# Patient Record
Sex: MALE | Race: WHITE | NOT HISPANIC OR LATINO | Employment: FULL TIME | URBAN - METROPOLITAN AREA
[De-identification: names, ages, dates, MRNs, and addresses within clinical notes are randomized per-mention and may not be internally consistent; named-entity substitution may affect disease eponyms.]

---

## 2018-01-12 ENCOUNTER — OFFICE VISIT (OUTPATIENT)
Dept: FAMILY MEDICINE | Facility: CLINIC | Age: 28
End: 2018-01-12
Payer: COMMERCIAL

## 2018-01-12 VITALS
TEMPERATURE: 98.8 F | BODY MASS INDEX: 23.24 KG/M2 | HEART RATE: 80 BPM | SYSTOLIC BLOOD PRESSURE: 108 MMHG | HEIGHT: 71 IN | WEIGHT: 166 LBS | DIASTOLIC BLOOD PRESSURE: 70 MMHG

## 2018-01-12 DIAGNOSIS — B37.9 CANDIDA INFECTION: Primary | ICD-10-CM

## 2018-01-12 PROCEDURE — 99213 OFFICE O/P EST LOW 20 MIN: CPT | Performed by: NURSE PRACTITIONER

## 2018-01-12 RX ORDER — NYSTATIN 100000 U/G
CREAM TOPICAL 3 TIMES DAILY
Qty: 15 G | Refills: 1 | Status: SHIPPED | OUTPATIENT
Start: 2018-01-12 | End: 2018-01-30

## 2018-01-12 NOTE — PATIENT INSTRUCTIONS
Nystatin sent to the pharmacy for your symptoms    Follow up if symptoms do not improve or worsen.

## 2018-01-12 NOTE — PROGRESS NOTES
"  SUBJECTIVE:   Jesse Cisneros is a 27 year old male who presents to clinic today for the following health issues:      Wound       Duration: 2 week     Description (location/character/radiation): wound like are in butt crack     Intensity:  mild    Accompanying signs and symptoms: itching and pain a times. Drains clear liquid.     History (similar episodes/previous evaluation): None    Precipitating or alleviating factors: None    Therapies tried and outcome: Nick       Problem list and histories reviewed & adjusted, as indicated.  Additional history: as documented    Patient Active Problem List   Diagnosis     iamLUMBAGO     History reviewed. No pertinent surgical history.    Social History   Substance Use Topics     Smoking status: Current Every Day Smoker     Packs/day: 1.00     Types: Cigarettes     Smokeless tobacco: Former User     Alcohol use Yes      Comment: 1 beer daily.     History reviewed. No pertinent family history.      Current Outpatient Prescriptions   Medication Sig Dispense Refill     nystatin (MYCOSTATIN) cream Apply topically 3 times daily for 14 days 15 g 1     No Known Allergies  Labs reviewed in EPIC      Reviewed and updated as needed this visit by clinical staff     Reviewed and updated as needed this visit by Provider       ROS:  Constitutional, HEENT, cardiovascular, pulmonary, gi and gu systems are negative, except as otherwise noted.      OBJECTIVE:   /70 (Cuff Size: Adult Regular)  Pulse 80  Temp 98.8  F (37.1  C) (Tympanic)  Ht 5' 10.75\" (1.797 m)  Wt 166 lb (75.3 kg)  BMI 23.32 kg/m2  Body mass index is 23.32 kg/(m^2).  GENERAL: healthy, alert and no distress  SKIN: erythematous patch between intergluteal cleft  PSYCH: mentation appears normal, affect normal/bright    Diagnostic Test Results:  none     ASSESSMENT/PLAN:     1. Candida infection  Nystatin sent to the pharmacy for symptoms.  Symptomatic care and follow up discussed.  - nystatin (MYCOSTATIN) cream; Apply " topically 3 times daily for 14 days  Dispense: 15 g; Refill: 1    Home care instructions were reviewed with the patient. The risks, benefits and treatment options of prescribed medications or other treatments have been discussed with the patient. The patient verbalized their understanding and should call or follow up if no improvement or if they develop further problems.    Patient Instructions   Nystatin sent to the pharmacy for your symptoms    Follow up if symptoms do not improve or worsen.        MILDRED Winslow [facility]

## 2018-01-12 NOTE — MR AVS SNAPSHOT
"              After Visit Summary   2018    Jesse Cisneros    MRN: [id number]           Patient Information     Date Of Birth          1990        Visit Information        Provider Department      2018 4:20 PM Maria Elena Vincent APRN CNP [facility]        Today's Diagnoses     Candida infection    -  1      Care Instructions    Nystatin sent to the pharmacy for your symptoms    Follow up if symptoms do not improve or worsen.            Follow-ups after your visit        Who to contact     If you have questions or need follow up information about today's clinic visit or your schedule please contact [facility] directly at [phone].  Normal or non-critical lab and imaging results will be communicated to you by MyChart, letter or phone within 4 business days after the clinic has received the results. If you do not hear from us within 7 days, please contact the clinic through MyChart or phone. If you have a critical or abnormal lab result, we will notify you by phone as soon as possible.  Submit refill requests through dineout or call your pharmacy and they will forward the refill request to us. Please allow 3 business days for your refill to be completed.          Additional Information About Your Visit        MyChart Information     dineout lets you send messages to your doctor, view your test results, renew your prescriptions, schedule appointments and more. To sign up, go to [email or web address] . Click on \"Log in\" on the left side of the screen, which will take you to the Welcome page. Then click on \"Sign up Now\" on the right side of the page.     You will be asked to enter the access code listed below, as well as some personal information. Please follow the directions to create your username and password.     Your access code is: [email or web address]  Expires: 2018  4:53 PM     Your access code will  in 90 days. If you need help or a new code, " "please call your [facility] clinic or [phone].        Care EveryWhere ID     This is your Care EveryWhere ID. This could be used by other organizations to access your [facility] medical records  [id number]        Your Vitals Were     Pulse Temperature Height BMI (Body Mass Index)          80 98.8  F (37.1  C) (Tympanic) 5' 10.75\" (1.797 m) 23.32 kg/m2         Blood Pressure from Last 3 Encounters:   01/12/18 108/70   05/31/16 114/77   07/23/15 110/73    Weight from Last 3 Encounters:   01/12/18 166 lb (75.3 kg)   07/23/15 150 lb (68 kg)              Today, you had the following     No orders found for display         Today's Medication Changes          These changes are accurate as of: 1/12/18  4:53 PM.  If you have any questions, ask your nurse or doctor.               Start taking these medicines.        Dose/Directions    nystatin cream   Commonly known as:  MYCOSTATIN   Used for:  Candida infection   Started by:  Maria Elena Vincent APRN CNP        Apply topically 3 times daily for 14 days   Quantity:  15 g   Refills:  1         Stop taking these medicines if you haven't already. Please contact your care team if you have questions.     traMADol 50 MG tablet   Commonly known as:  ULTRAM   Stopped by:  Maria Elena Vincent APRN CNP                Where to get your medicines      These medications were sent to [facility] [address] [address]     Phone:  [phone]     nystatin cream                Primary Care Provider Office Phone # Fax #    [facility] [phone] [address] [facility] [address]        Equal Access to Services     DEONNA CORONA AH: Hadii clemente Lynn, wadexterda luqadaha, qaybta kaalmada adeegyada, gila solares. So [facility] [phone].    ATENCIÓN: Si habla español, tiene a sung disposición servicios gratuitos de asistencia lingüística. " Ramirez rowe [phone].    We comply with applicable federal civil rights laws and [place] laws. We do not discriminate on the basis of race, color, national origin, age, disability, sex, sexual orientation, or gender identity.            Thank you!     Thank you for choosing [facility]  for your care. Our goal is always to provide you with excellent care. Hearing back from our patients is one way we can continue to improve our services. Please take a few minutes to complete the written survey that you may receive in the mail after your visit with us. Thank you!             Your Updated Medication List - Protect others around you: Learn how to safely use, store and throw away your medicines at www.disposemymeds.org.          This list is accurate as of: 1/12/18  4:53 PM.  Always use your most recent med list.                   Brand Name Dispense Instructions for use Diagnosis    nystatin cream    MYCOSTATIN    15 g    Apply topically 3 times daily for 14 days    Candida infection

## 2018-01-12 NOTE — NURSING NOTE
"Chief Complaint   Patient presents with     Wound Check       Initial /70 (Cuff Size: Adult Regular)  Pulse 80  Temp 98.8  F (37.1  C) (Tympanic)  Ht 5' 10.75\" (1.797 m)  Wt 166 lb (75.3 kg)  BMI 23.32 kg/m2 Estimated body mass index is 23.32 kg/(m^2) as calculated from the following:    Height as of this encounter: 5' 10.75\" (1.797 m).    Weight as of this encounter: 166 lb (75.3 kg).  Medication Reconciliation: complete    Health Maintenance that is potentially due pending provider review:  NONE    n/a    Kavitha Astudillo, CMA      "

## 2018-01-30 ENCOUNTER — OFFICE VISIT (OUTPATIENT)
Dept: FAMILY MEDICINE | Facility: CLINIC | Age: 28
End: 2018-01-30
Payer: COMMERCIAL

## 2018-01-30 VITALS
WEIGHT: 167 LBS | BODY MASS INDEX: 23.38 KG/M2 | SYSTOLIC BLOOD PRESSURE: 106 MMHG | TEMPERATURE: 98.1 F | DIASTOLIC BLOOD PRESSURE: 64 MMHG | HEART RATE: 72 BPM | HEIGHT: 71 IN

## 2018-01-30 DIAGNOSIS — B37.9 CANDIDA INFECTION: ICD-10-CM

## 2018-01-30 DIAGNOSIS — L03.317 CELLULITIS OF BUTTOCK: Primary | ICD-10-CM

## 2018-01-30 PROCEDURE — 99213 OFFICE O/P EST LOW 20 MIN: CPT | Performed by: NURSE PRACTITIONER

## 2018-01-30 RX ORDER — NYSTATIN 100000 U/G
CREAM TOPICAL 3 TIMES DAILY
Qty: 15 G | Refills: 1 | COMMUNITY
Start: 2018-01-30 | End: 2019-06-05

## 2018-01-30 RX ORDER — CEPHALEXIN 500 MG/1
500 CAPSULE ORAL 3 TIMES DAILY
Qty: 21 CAPSULE | Refills: 0 | Status: SHIPPED | OUTPATIENT
Start: 2018-01-30 | End: 2018-02-06

## 2018-01-30 NOTE — MR AVS SNAPSHOT
"              After Visit Summary   1/30/2018    Jesse Cisneros    MRN: [id number]           Patient Information     Date Of Birth          1990        Visit Information        Provider Department      1/30/2018 4:20 PM Maria Elena Vincent APRN CNP [facility]        Today's Diagnoses     Cellulitis of buttock    -  1    Candida infection          Care Instructions    Keflex sent to the pharmacy for symptoms    Continue with the ointment    Follow up if symptoms do not improve or worsen.            Follow-ups after your visit        Who to contact     If you have questions or need follow up information about today's clinic visit or your schedule please contact [facility] directly at [phone].  Normal or non-critical lab and imaging results will be communicated to you by Percentilhart, letter or phone within 4 business days after the clinic has received the results. If you do not hear from us within 7 days, please contact the clinic through MyChart or phone. If you have a critical or abnormal lab result, we will notify you by phone as soon as possible.  Submit refill requests through itembase or call your pharmacy and they will forward the refill request to us. Please allow 3 business days for your refill to be completed.          Additional Information About Your Visit        MyChart Information     itembase lets you send messages to your doctor, view your test results, renew your prescriptions, schedule appointments and more. To sign up, go to [email or web address] . Click on \"Log in\" on the left side of the screen, which will take you to the Welcome page. Then click on \"Sign up Now\" on the right side of the page.     You will be asked to enter the access code listed below, as well as some personal information. Please follow the directions to create your username and password.     Your access code is: [email or web address]  Expires: 4/12/2018  4:53 PM     Your access code will " " in 90 days. If you need help or a new code, please call your [facility] clinic or [phone].        Care EveryWhere ID     This is your Care EveryWhere ID. This could be used by other organizations to access your [facility] medical records  [id number]        Your Vitals Were     Pulse Temperature Height BMI (Body Mass Index)          72 98.1  F (36.7  C) (Tympanic) 5' 10.75\" (1.797 m) 23.46 kg/m2         Blood Pressure from Last 3 Encounters:   18 106/64   18 108/70   16 114/77    Weight from Last 3 Encounters:   18 167 lb (75.8 kg)   18 166 lb (75.3 kg)   07/23/15 150 lb (68 kg)              Today, you had the following     No orders found for display         Today's Medication Changes          These changes are accurate as of 18  4:29 PM.  If you have any questions, ask your nurse or doctor.               Start taking these medicines.        Dose/Directions    cephALEXin 500 MG capsule   Commonly known as:  KEFLEX   Used for:  Cellulitis of buttock   Started by:  Maria Elena Vincent APRN CNP        Dose:  500 mg   Take 1 capsule (500 mg) by mouth 3 times daily for 7 days   Quantity:  21 capsule   Refills:  0            Where to get your medicines      These medications were sent to [facility] [address] [address]     Phone:  [phone]     cephALEXin 500 MG capsule                Primary Care Provider Office Phone # Fax #    [facility] [phone] [address] [facility] [address]        Equal Access to Services     DEONNA CORONA AH: Hadradames Lynn, tonya marcus, qamagalys kaalmagila diallo. So [facility] [phone].    ATENCIÓN: Si habla español, tiene a sung disposición servicios gratuitos de asistencia lingüística. Llame al [phone].    We comply with applicable federal civil rights laws and " [place] laws. We do not discriminate on the basis of race, color, national origin, age, disability, sex, sexual orientation, or gender identity.            Thank you!     Thank you for choosing [facility]  for your care. Our goal is always to provide you with excellent care. Hearing back from our patients is one way we can continue to improve our services. Please take a few minutes to complete the written survey that you may receive in the mail after your visit with us. Thank you!             Your Updated Medication List - Protect others around you: Learn how to safely use, store and throw away your medicines at www.disposemymeds.org.          This list is accurate as of 1/30/18  4:29 PM.  Always use your most recent med list.                   Brand Name Dispense Instructions for use Diagnosis    cephALEXin 500 MG capsule    KEFLEX    21 capsule    Take 1 capsule (500 mg) by mouth 3 times daily for 7 days    Cellulitis of buttock       nystatin cream    MYCOSTATIN    15 g    Apply topically 3 times daily    Candida infection

## 2018-01-30 NOTE — PATIENT INSTRUCTIONS
Keflex sent to the pharmacy for symptoms    Continue with the ointment    Follow up if symptoms do not improve or worsen.

## 2018-01-30 NOTE — PROGRESS NOTES
"  SUBJECTIVE:   Jesse Cisneros is a 27 year old male who presents to clinic today for the following health issues:      Infection Follow Up       Duration: 1 month     Description (location/character/radiation): wound like area gluteal clef     Intensity:  moderate    Accompanying signs and symptoms: feeling like leaking at times, now has bumps around area that itch     History (similar episodes/previous evaluation): yes - follow up      Therapies tried and outcome: nystatin      Leaking has improved    Problem list and histories reviewed & adjusted, as indicated.  Additional history: as documented    Patient Active Problem List   Diagnosis     iamLUMBAGO     History reviewed. No pertinent surgical history.    Social History   Substance Use Topics     Smoking status: Current Every Day Smoker     Packs/day: 1.00     Types: Cigarettes     Smokeless tobacco: Former User     Alcohol use Yes      Comment: 1 beer daily.     History reviewed. No pertinent family history.      Current Outpatient Prescriptions   Medication Sig Dispense Refill     nystatin (MYCOSTATIN) cream Apply topically 3 times daily 15 g 1     cephALEXin (KEFLEX) 500 MG capsule Take 1 capsule (500 mg) by mouth 3 times daily for 7 days 21 capsule 0     No Known Allergies  Labs reviewed in EPIC    Reviewed and updated as needed this visit by clinical staff       Reviewed and updated as needed this visit by Provider         ROS:  Constitutional, HEENT, cardiovascular, pulmonary, gi and gu systems are negative, except as otherwise noted.    OBJECTIVE:     /64 (Cuff Size: Adult Regular)  Pulse 72  Temp 98.1  F (36.7  C) (Tympanic)  Ht 5' 10.75\" (1.797 m)  Wt 167 lb (75.8 kg)  BMI 23.46 kg/m2  Body mass index is 23.46 kg/(m^2).  GENERAL: healthy, alert and no distress  SKIN: mild erythema surrounding 2 cm split at intergluteal clef   PSYCH: mentation appears normal, affect normal/bright    Diagnostic Test Results:  none     ASSESSMENT/PLAN:     1. " Cellulitis of buttock  With ongoing symptoms and surround erythema Keflex sent to the pharmacy for symptoms.  Symptomatic care and follow up discussed.  - cephALEXin (KEFLEX) 500 MG capsule; Take 1 capsule (500 mg) by mouth 3 times daily for 7 days  Dispense: 21 capsule; Refill: 0    Home care instructions were reviewed with the patient. The risks, benefits and treatment options of prescribed medications or other treatments have been discussed with the patient. The patient verbalized their understanding and should call or follow up if no improvement or if they develop further problems.      MILDRED Winslow [facility]

## 2018-01-30 NOTE — NURSING NOTE
"Chief Complaint   Patient presents with     Infection     follow up        Initial /64 (Cuff Size: Adult Regular)  Pulse 72  Temp 98.1  F (36.7  C) (Tympanic)  Ht 5' 10.75\" (1.797 m)  Wt 167 lb (75.8 kg)  BMI 23.46 kg/m2 Estimated body mass index is 23.46 kg/(m^2) as calculated from the following:    Height as of this encounter: 5' 10.75\" (1.797 m).    Weight as of this encounter: 167 lb (75.8 kg).  Medication Reconciliation: complete    Health Maintenance that is potentially due pending provider review:  NONE    n/a    Kavitha Astudillo, CMA        "

## 2018-02-13 ENCOUNTER — TELEPHONE (OUTPATIENT)
Dept: FAMILY MEDICINE | Facility: CLINIC | Age: 28
End: 2018-02-13

## 2018-02-13 NOTE — TELEPHONE ENCOUNTER
Reason for Call:  Other     Detailed comments: Patient was seen on 01/30/18 and was given nystatin and it worked but now it came back - please call pt    Phone Number Patient can be reached at: Home number on file [phone] (home)    Best Time:     Can we leave a detailed message on this number? YES    Call taken on 2/13/2018 at 3:30 PM by Aziza Fernandez

## 2018-02-13 NOTE — TELEPHONE ENCOUNTER
Matias says the area was getting better but now the crack is back on his skin . He is using the Nystatin cream 3 times per day. Appointment made for tomorrow to make sure it is not infected again

## 2019-06-05 ENCOUNTER — OFFICE VISIT (OUTPATIENT)
Dept: FAMILY MEDICINE | Facility: CLINIC | Age: 29
End: 2019-06-05
Payer: COMMERCIAL

## 2019-06-05 VITALS
TEMPERATURE: 97.9 F | DIASTOLIC BLOOD PRESSURE: 70 MMHG | BODY MASS INDEX: 21.35 KG/M2 | RESPIRATION RATE: 12 BRPM | WEIGHT: 152.5 LBS | SYSTOLIC BLOOD PRESSURE: 116 MMHG | HEIGHT: 71 IN | HEART RATE: 56 BPM

## 2019-06-05 DIAGNOSIS — R19.7 DIARRHEA, UNSPECIFIED TYPE: Primary | ICD-10-CM

## 2019-06-05 DIAGNOSIS — R10.32 LEFT LOWER QUADRANT PAIN: ICD-10-CM

## 2019-06-05 DIAGNOSIS — R11.2 NAUSEA AND VOMITING, INTRACTABILITY OF VOMITING NOT SPECIFIED, UNSPECIFIED VOMITING TYPE: ICD-10-CM

## 2019-06-05 DIAGNOSIS — R68.89 UNINTENTIONAL WEIGHT CHANGE: ICD-10-CM

## 2019-06-05 LAB
ANION GAP SERPL CALCULATED.3IONS-SCNC: 4 MMOL/L (ref 3–14)
BASOPHILS # BLD AUTO: 0.1 10E9/L (ref 0–0.2)
BASOPHILS NFR BLD AUTO: 1.4 %
BUN SERPL-MCNC: 13 MG/DL (ref 7–30)
CALCIUM SERPL-MCNC: 8.9 MG/DL (ref 8.5–10.1)
CHLORIDE SERPL-SCNC: 109 MMOL/L (ref 94–109)
CO2 SERPL-SCNC: 24 MMOL/L (ref 20–32)
CREAT SERPL-MCNC: 0.83 MG/DL (ref 0.66–1.25)
DIFFERENTIAL METHOD BLD: ABNORMAL
EOSINOPHIL # BLD AUTO: 0.2 10E9/L (ref 0–0.7)
EOSINOPHIL NFR BLD AUTO: 2.4 %
ERYTHROCYTE [DISTWIDTH] IN BLOOD BY AUTOMATED COUNT: 12.9 % (ref 10–15)
GFR SERPL CREATININE-BSD FRML MDRD: >90 ML/MIN/{1.73_M2}
GLUCOSE SERPL-MCNC: 101 MG/DL (ref 70–99)
HCT VFR BLD AUTO: 40.7 % (ref 40–53)
HGB BLD-MCNC: 13.9 G/DL (ref 13.3–17.7)
LYMPHOCYTES # BLD AUTO: 1.2 10E9/L (ref 0.8–5.3)
LYMPHOCYTES NFR BLD AUTO: 18.8 %
MCH RBC QN AUTO: 32.9 PG (ref 26.5–33)
MCHC RBC AUTO-ENTMCNC: 34.2 G/DL (ref 31.5–36.5)
MCV RBC AUTO: 96 FL (ref 78–100)
MONOCYTES # BLD AUTO: 0.7 10E9/L (ref 0–1.3)
MONOCYTES NFR BLD AUTO: 10.5 %
NEUTROPHILS # BLD AUTO: 4.2 10E9/L (ref 1.6–8.3)
NEUTROPHILS NFR BLD AUTO: 66.9 %
PLATELET # BLD AUTO: 264 10E9/L (ref 150–450)
POTASSIUM SERPL-SCNC: 4.5 MMOL/L (ref 3.4–5.3)
RBC # BLD AUTO: 4.22 10E12/L (ref 4.4–5.9)
SODIUM SERPL-SCNC: 137 MMOL/L (ref 133–144)
TSH SERPL DL<=0.005 MIU/L-ACNC: 0.55 MU/L (ref 0.4–4)
WBC # BLD AUTO: 6.3 10E9/L (ref 4–11)

## 2019-06-05 PROCEDURE — 85025 COMPLETE CBC W/AUTO DIFF WBC: CPT | Performed by: NURSE PRACTITIONER

## 2019-06-05 PROCEDURE — 99214 OFFICE O/P EST MOD 30 MIN: CPT | Performed by: NURSE PRACTITIONER

## 2019-06-05 PROCEDURE — 84443 ASSAY THYROID STIM HORMONE: CPT | Performed by: NURSE PRACTITIONER

## 2019-06-05 PROCEDURE — 36415 COLL VENOUS BLD VENIPUNCTURE: CPT | Performed by: NURSE PRACTITIONER

## 2019-06-05 PROCEDURE — 80048 BASIC METABOLIC PNL TOTAL CA: CPT | Performed by: NURSE PRACTITIONER

## 2019-06-05 RX ORDER — ONDANSETRON 4 MG/1
4-8 TABLET, FILM COATED ORAL EVERY 8 HOURS PRN
Qty: 30 TABLET | Refills: 1 | Status: SHIPPED | OUTPATIENT
Start: 2019-06-05 | End: 2019-09-13

## 2019-06-05 ASSESSMENT — MIFFLIN-ST. JEOR: SCORE: 1660.93

## 2019-06-05 NOTE — NURSING NOTE
"Initial /70   Pulse 56   Temp 97.9  F (36.6  C) (Tympanic)   Resp 12   Ht 1.791 m (5' 10.5\")   Wt 69.2 kg (152 lb 8 oz)   BMI 21.57 kg/m   Estimated body mass index is 21.57 kg/m  as calculated from the following:    Height as of this encounter: 1.791 m (5' 10.5\").    Weight as of this encounter: 69.2 kg (152 lb 8 oz). .      "

## 2019-06-05 NOTE — PROGRESS NOTES
Subjective     Jesse Cisneros is a 29 year old male who presents to clinic today for the following health issues:    HPI   Chief Complaint   Patient presents with     Gastrointestinal Problem     Symptoms  for 2 months. States diarrhea (soft unformed, 6-8 times a day), constipation, nausea, vomiting, LLQ abdominal pain, acidic feeling in stomach. No blood in stool, increased gas, bloating. States no correlation with meals or fluids. Has tried omeprazole, change in diet, decreasing alcohol, increasing fluids to little effect. No recent travel. States some increased stress and anxiety, getting  soon.       ABDOMINAL   PAIN     Onset: 2 months    Description:   Character: Dull ache  Location: left lower quadrant  Radiation: None    Intensity: mild-moderate    Progression of Symptoms:  same    Accompanying Signs & Symptoms:  Fever/Chills?: no   Gas/Bloating: YES  Nausea: YES  Vomitting: YES  Diarrhea?: YES  Constipation:no   Dysuria or Hematuria: no    History:   Trauma: no   Previous similar pain: no    Previous tests done: none    Precipitating factors:   Does the pain change with:     Food: hot stuff    BM: no     Urination: no     Alleviating factors:  none    Therapies Tried and outcome: omeprazole, fluids, dietary changs    LMP:  not applicable     No significant family history     Patient Active Problem List   Diagnosis     iamLUMBAGO     History reviewed. No pertinent surgical history.    Social History     Tobacco Use     Smoking status: Current Every Day Smoker     Packs/day: 1.00     Types: Cigarettes     Smokeless tobacco: Former User   Substance Use Topics     Alcohol use: Yes     Comment: 1 beer daily.     History reviewed. No pertinent family history.      Current Outpatient Medications   Medication Sig Dispense Refill     ondansetron (ZOFRAN) 4 MG tablet Take 1-2 tablets (4-8 mg) by mouth every 8 hours as needed for nausea 30 tablet 1     No Known Allergies    Reviewed and updated as needed this  "visit by Provider  Tobacco  Allergies  Meds  Problems  Med Hx  Surg Hx  Fam Hx         Review of Systems   ROS COMP: Constitutional, HEENT, cardiovascular, pulmonary, gi and gu systems are negative, except as otherwise noted.      Objective    /70   Pulse 56   Temp 97.9  F (36.6  C) (Tympanic)   Resp 12   Ht 1.791 m (5' 10.5\")   Wt 69.2 kg (152 lb 8 oz)   BMI 21.57 kg/m    Body mass index is 21.57 kg/m .  Physical Exam   GENERAL: healthy, alert and no distress  NECK: no adenopathy, no asymmetry, masses, or scars and thyroid normal to palpation  RESP: lungs clear to auscultation - no rales, rhonchi or wheezes  CV: regular rate and rhythm, normal S1 S2, no S3 or S4, no murmur, click or rub  ABDOMEN: soft, nontender, no hepatosplenomegaly, no masses and bowel sounds normal  MS: no gross musculoskeletal defects noted, no edema  NEURO: Normal strength and tone, mentation intact and speech normal  PSYCH: mentation appears normal, affect normal/bright    Diagnostic Test Results:  Results for orders placed or performed in visit on 06/05/19 (from the past 24 hour(s))   CBC with platelets and differential   Result Value Ref Range    WBC 6.3 4.0 - 11.0 10e9/L    RBC Count 4.22 (L) 4.4 - 5.9 10e12/L    Hemoglobin 13.9 13.3 - 17.7 g/dL    Hematocrit 40.7 40.0 - 53.0 %    MCV 96 78 - 100 fl    MCH 32.9 26.5 - 33.0 pg    MCHC 34.2 31.5 - 36.5 g/dL    RDW 12.9 10.0 - 15.0 %    Platelet Count 264 150 - 450 10e9/L    % Neutrophils 66.9 %    % Lymphocytes 18.8 %    % Monocytes 10.5 %    % Eosinophils 2.4 %    % Basophils 1.4 %    Absolute Neutrophil 4.2 1.6 - 8.3 10e9/L    Absolute Lymphocytes 1.2 0.8 - 5.3 10e9/L    Absolute Monocytes 0.7 0.0 - 1.3 10e9/L    Absolute Eosinophils 0.2 0.0 - 0.7 10e9/L    Absolute Basophils 0.1 0.0 - 0.2 10e9/L    Diff Method Automated Method            Assessment & Plan     1. Diarrhea, unspecified type  With ongoing diarrhea, nausea and vomiting and unintentional weight loss will do " labs, stool cultures and plan for endoscopy and colonoscopy for further evaluation.  - Clostridium difficile Toxin B PCR; Future  - Enteric Bacteria and Virus Panel by [facility] Stool; Future  - Ova and Parasite Exam Routine; Future  - H Pylori antigen, stool; Future  - GASTROENTEROLOGY ADULT REF PROCEDURE ONLY  - TSH with free T4 reflex  - CBC with platelets and differential  - Basic metabolic panel    2. Nausea and vomiting, intractability of vomiting not specified, unspecified vomiting type  Per above.  Zofran as needed for anxiety.  - GASTROENTEROLOGY ADULT REF PROCEDURE ONLY  - ondansetron (ZOFRAN) 4 MG tablet; Take 1-2 tablets (4-8 mg) by mouth every 8 hours as needed for nausea  Dispense: 30 tablet; Refill: 1  - TSH with free T4 reflex  - CBC with platelets and differential  - Basic metabolic panel    3. Left lower quadrant pain  Per above.   - GASTROENTEROLOGY ADULT REF PROCEDURE ONLY  - TSH with free T4 reflex  - CBC with platelets and differential  - Basic metabolic panel    4. Unintentional weight change  Per above.   - GASTROENTEROLOGY ADULT REF PROCEDURE ONLY  - TSH with free T4 reflex  - CBC with platelets and differential  - Basic metabolic panel    Home care instructions were reviewed with the patient. The risks, benefits and treatment options of prescribed medications or other treatments have been discussed with the patient. The patient verbalized their understanding and should call or follow up if no improvement or if they develop further problems    Patient Instructions   Schedule your upper and lower endoscopy    Send stool samples back    Zofran as needed for nausea    Follow up if symptoms do not improve or worsen.        Return in about 1 week (around 6/12/2019), or if symptoms worsen or fail to improve.    MILDRED Winslow [facility]

## 2019-06-05 NOTE — LETTER
June 5, 2019      Jesse Cisneros  [address] [address]        To Whom It May Concern:    Jesse Cisneros was seen in our clinic.     Sincerely,        MILDRED Winslow CNP

## 2019-06-05 NOTE — PATIENT INSTRUCTIONS
Schedule your upper and lower endoscopy    Send stool samples back    Zofran as needed for nausea    Follow up if symptoms do not improve or worsen.

## 2019-06-06 DIAGNOSIS — R19.7 DIARRHEA, UNSPECIFIED TYPE: ICD-10-CM

## 2019-06-06 PROCEDURE — 87338 HPYLORI STOOL AG IA: CPT | Performed by: NURSE PRACTITIONER

## 2019-06-06 PROCEDURE — 87209 SMEAR COMPLEX STAIN: CPT | Performed by: NURSE PRACTITIONER

## 2019-06-06 PROCEDURE — 87177 OVA AND PARASITES SMEARS: CPT | Performed by: NURSE PRACTITIONER

## 2019-06-06 PROCEDURE — 87506 IADNA-DNA/RNA PROBE TQ 6-11: CPT | Performed by: NURSE PRACTITIONER

## 2019-06-07 LAB
C COLI+JEJUNI+LARI FUSA STL QL NAA+PROBE: NOT DETECTED
EC STX1 GENE STL QL NAA+PROBE: NOT DETECTED
EC STX2 GENE STL QL NAA+PROBE: NOT DETECTED
ENTERIC PATHOGEN COMMENT: NORMAL
H PYLORI AG STL QL IA: NORMAL
NOROV GI+II ORF1-ORF2 JNC STL QL NAA+PR: NOT DETECTED
O+P STL MICRO: NORMAL
O+P STL MICRO: NORMAL
RVA NSP5 STL QL NAA+PROBE: NOT DETECTED
SALMONELLA SP RPOD STL QL NAA+PROBE: NOT DETECTED
SHIGELLA SP+EIEC IPAH STL QL NAA+PROBE: NOT DETECTED
SPECIMEN SOURCE: NORMAL
SPECIMEN SOURCE: NORMAL
V CHOL+PARA RFBL+TRKH+TNAA STL QL NAA+PR: NOT DETECTED
Y ENTERO RECN STL QL NAA+PROBE: NOT DETECTED

## 2019-06-25 ENCOUNTER — ANESTHESIA EVENT (OUTPATIENT)
Dept: GASTROENTEROLOGY | Facility: CLINIC | Age: 29
End: 2019-06-25
Payer: COMMERCIAL

## 2019-06-25 ASSESSMENT — LIFESTYLE VARIABLES: TOBACCO_USE: 1

## 2019-06-25 NOTE — ANESTHESIA PREPROCEDURE EVALUATION
Anesthesia Pre-Procedure Evaluation    Patient: Jesse Cisneros   MRN: [id number] : 1990          Preoperative Diagnosis: diarrhea, nausea and vomiting, left lower quadrant pain, unintentional weight loss  diagnostic    Procedure(s):  COLONOSCOPY  ESOPHAGOGASTRODUODENOSCOPY (EGD)    No past medical history on file.  History reviewed. No pertinent surgical history.    Anesthesia Evaluation     .             ROS/MED HX    ENT/Pulmonary:     (+)tobacco use, Current use , . .    Neurologic:       Cardiovascular:         METS/Exercise Tolerance:     Hematologic:         Musculoskeletal:   (+)  other musculoskeletal- Lumbago      GI/Hepatic:         Renal/Genitourinary:         Endo:         Psychiatric:         Infectious Disease:         Malignancy:         Other:                          Physical Exam  Normal systems: cardiovascular, pulmonary and dental    Airway   Mallampati: I  TM distance: >3 FB  Neck ROM: full    Dental     Cardiovascular   Rhythm and rate: regular and normal      Pulmonary    breath sounds clear to auscultation            Lab Results   Component Value Date    WBC 6.3 2019    HGB 13.9 2019    HCT 40.7 2019     2019     2019    POTASSIUM 4.5 2019    CHLORIDE 109 2019    CO2 24 2019    BUN 13 2019    CR 0.83 2019     (H) 2019    WINDY 8.9 2019    ALBUMIN 4.5 2005    PROTTOTAL 8.2 2005    ALT 12 2005    AST 21 2005    ALKPHOS 89 2005    BILITOTAL 0.7 2005    TSH 0.55 2019    T4 1.45 2005       Preop Vitals  BP Readings from Last 3 Encounters:   19 116/70   18 106/64   18 108/70    Pulse Readings from Last 3 Encounters:   19 56   18 72   18 80      Resp Readings from Last 3 Encounters:   19 12   16 18   14 18    SpO2 Readings from Last 3 Encounters:   16 97%   14 99%   14 100%      Temp  "Readings from Last 1 Encounters:   06/05/19 36.6  C (97.9  F) (Tympanic)    Ht Readings from Last 1 Encounters:   06/05/19 1.791 m (5' 10.5\")      Wt Readings from Last 1 Encounters:   06/05/19 69.2 kg (152 lb 8 oz)    Estimated body mass index is 21.57 kg/m  as calculated from the following:    Height as of 6/5/19: 1.791 m (5' 10.5\").    Weight as of 6/5/19: 69.2 kg (152 lb 8 oz).       Anesthesia Plan      History & Physical Review  History and physical reviewed and following examination; no interval change.    ASA Status:  2 .    NPO Status:  > 6 hours    Plan for MAC Reason for MAC:  Deep or markedly invasive procedure (G8)         Postoperative Care      Consents  Anesthetic plan, risks, benefits and alternatives discussed with:  Patient..                 MILDRED Garcias CRNA  "

## 2019-06-27 ENCOUNTER — HOSPITAL ENCOUNTER (OUTPATIENT)
Facility: CLINIC | Age: 29
Discharge: HOME | End: 2019-06-27
Attending: SURGERY | Admitting: SURGERY
Payer: COMMERCIAL

## 2019-06-27 ENCOUNTER — ANESTHESIA (OUTPATIENT)
Dept: GASTROENTEROLOGY | Facility: CLINIC | Age: 29
End: 2019-06-27
Payer: COMMERCIAL

## 2019-06-27 VITALS
WEIGHT: 152 LBS | DIASTOLIC BLOOD PRESSURE: 79 MMHG | HEIGHT: 71 IN | BODY MASS INDEX: 21.28 KG/M2 | OXYGEN SATURATION: 100 % | TEMPERATURE: 97.5 F | RESPIRATION RATE: 16 BRPM | SYSTOLIC BLOOD PRESSURE: 110 MMHG

## 2019-06-27 LAB
COLONOSCOPY: NORMAL
UPPER GI ENDOSCOPY: NORMAL

## 2019-06-27 PROCEDURE — 37000009 ZZH ANESTHESIA TECHNICAL FEE, EACH ADDTL 15 MIN: Performed by: SURGERY

## 2019-06-27 PROCEDURE — 45380 COLONOSCOPY AND BIOPSY: CPT | Performed by: SURGERY

## 2019-06-27 PROCEDURE — 25800030 ZZH RX IP 258 OP 636: Performed by: SURGERY

## 2019-06-27 PROCEDURE — 25000128 H RX IP 250 OP 636: Performed by: NURSE ANESTHETIST, CERTIFIED REGISTERED

## 2019-06-27 PROCEDURE — 43239 EGD BIOPSY SINGLE/MULTIPLE: CPT | Performed by: SURGERY

## 2019-06-27 PROCEDURE — 25000125 ZZHC RX 250: Performed by: NURSE ANESTHETIST, CERTIFIED REGISTERED

## 2019-06-27 PROCEDURE — 37000008 ZZH ANESTHESIA TECHNICAL FEE, 1ST 30 MIN: Performed by: SURGERY

## 2019-06-27 PROCEDURE — 25000125 ZZHC RX 250: Performed by: SURGERY

## 2019-06-27 PROCEDURE — 88305 TISSUE EXAM BY PATHOLOGIST: CPT | Performed by: SURGERY

## 2019-06-27 PROCEDURE — 43239 EGD BIOPSY SINGLE/MULTIPLE: CPT | Mod: 51 | Performed by: SURGERY

## 2019-06-27 PROCEDURE — 88305 TISSUE EXAM BY PATHOLOGIST: CPT | Mod: 26 | Performed by: SURGERY

## 2019-06-27 RX ORDER — SODIUM CHLORIDE, SODIUM LACTATE, POTASSIUM CHLORIDE, CALCIUM CHLORIDE 600; 310; 30; 20 MG/100ML; MG/100ML; MG/100ML; MG/100ML
INJECTION, SOLUTION INTRAVENOUS CONTINUOUS
Status: DISCONTINUED | OUTPATIENT
Start: 2019-06-27 | End: 2019-06-27 | Stop reason: HOSPADM

## 2019-06-27 RX ORDER — GLYCOPYRROLATE 0.2 MG/ML
INJECTION, SOLUTION INTRAMUSCULAR; INTRAVENOUS PRN
Status: DISCONTINUED | OUTPATIENT
Start: 2019-06-27 | End: 2019-06-27

## 2019-06-27 RX ORDER — LIDOCAINE 40 MG/G
CREAM TOPICAL
Status: DISCONTINUED | OUTPATIENT
Start: 2019-06-27 | End: 2019-06-27 | Stop reason: HOSPADM

## 2019-06-27 RX ORDER — PROPOFOL 10 MG/ML
INJECTION, EMULSION INTRAVENOUS PRN
Status: DISCONTINUED | OUTPATIENT
Start: 2019-06-27 | End: 2019-06-27

## 2019-06-27 RX ORDER — ONDANSETRON 2 MG/ML
4 INJECTION INTRAMUSCULAR; INTRAVENOUS
Status: DISCONTINUED | OUTPATIENT
Start: 2019-06-27 | End: 2019-06-27 | Stop reason: HOSPADM

## 2019-06-27 RX ADMIN — PROPOFOL 200 MG: 10 INJECTION, EMULSION INTRAVENOUS at 10:13

## 2019-06-27 RX ADMIN — GLYCOPYRROLATE 0.3 MG: 0.2 INJECTION, SOLUTION INTRAMUSCULAR; INTRAVENOUS at 10:03

## 2019-06-27 RX ADMIN — PROPOFOL 100 MG: 10 INJECTION, EMULSION INTRAVENOUS at 10:18

## 2019-06-27 RX ADMIN — SODIUM CHLORIDE, POTASSIUM CHLORIDE, SODIUM LACTATE AND CALCIUM CHLORIDE: 600; 310; 30; 20 INJECTION, SOLUTION INTRAVENOUS at 10:20

## 2019-06-27 RX ADMIN — PROPOFOL 200 MG: 10 INJECTION, EMULSION INTRAVENOUS at 10:05

## 2019-06-27 RX ADMIN — SODIUM CHLORIDE, POTASSIUM CHLORIDE, SODIUM LACTATE AND CALCIUM CHLORIDE: 600; 310; 30; 20 INJECTION, SOLUTION INTRAVENOUS at 08:55

## 2019-06-27 RX ADMIN — LIDOCAINE HYDROCHLORIDE: 10 INJECTION, SOLUTION EPIDURAL; INFILTRATION; INTRACAUDAL; PERINEURAL at 08:55

## 2019-06-27 RX ADMIN — TOPICAL ANESTHETIC 2 SPRAY: 200 SPRAY DENTAL; PERIODONTAL at 10:03

## 2019-06-27 RX ADMIN — PROPOFOL 100 MG: 10 INJECTION, EMULSION INTRAVENOUS at 10:16

## 2019-06-27 ASSESSMENT — MIFFLIN-ST. JEOR: SCORE: 1658.66

## 2019-06-27 NOTE — OP NOTE
EGD and Colonoscopy - esophagus, stomach and duodenum normal.  Biopsies taken of stomach and esophagus.  Colon normal without polyps or inflammation.  Random biopsies taken for microscopic colitis.

## 2019-06-27 NOTE — ANESTHESIA POSTPROCEDURE EVALUATION
Patient: Jesse Cisneros    Procedure(s):  COLONOSCOPY, WITH  BIOPSIES, ESOPHAGOGASTRODUODENOSCOPY WITH BIOPSIES  ESOPHAGOGASTRODUODENOSCOPY, WITH BIOPSY    Diagnosis:diarrhea, nausea and vomiting, left lower quadrant pain, unintentional weight loss  diagnostic  Diagnosis Additional Information: No value filed.    Anesthesia Type:  MAC    Note:  Anesthesia Post Evaluation    Patient location during evaluation: Bedside  Patient participation: Able to fully participate in evaluation  Level of consciousness: awake and alert  Pain management: adequate  Airway patency: patent  Cardiovascular status: acceptable  Respiratory status: acceptable  Hydration status: acceptable  PONV: none     Anesthetic complications: None          Last vitals:  Vitals:    06/27/19 0833   BP: 122/77   Resp: 16   Temp: 36.4  C (97.5  F)   SpO2: 100%         Electronically Signed By: MILDRED Damian CRNA  June 27, 2019  10:32 AM

## 2019-06-27 NOTE — ANESTHESIA CARE TRANSFER NOTE
Patient: Jesse Cisneros    Procedure(s):  COLONOSCOPY, WITH  BIOPSIES, ESOPHAGOGASTRODUODENOSCOPY WITH BIOPSIES  ESOPHAGOGASTRODUODENOSCOPY, WITH BIOPSY    Diagnosis: diarrhea, nausea and vomiting, left lower quadrant pain, unintentional weight loss  diagnostic  Diagnosis Additional Information: No value filed.    Anesthesia Type:   MAC     Note:  Airway :Room Air  Patient transferred to:Phase II  Handoff Report: Identifed the Patient, Identified the Reponsible Provider, Reviewed the pertinent medical history, Discussed the surgical course, Reviewed Intra-OP anesthesia mangement and issues during anesthesia, Set expectations for post-procedure period and Allowed opportunity for questions and acknowledgement of understanding      Vitals: (Last set prior to Anesthesia Care Transfer)    CRNA VITALS  6/27/2019 0959 - 6/27/2019 1032      6/27/2019             Pulse:  66    SpO2:  99 %                Electronically Signed By: MILDRED Damian CRNA  June 27, 2019  10:32 AM

## 2019-06-27 NOTE — H&P
29 year old year old male here for upper and lower endoscopy for weight loss.        Patient Active Problem List   Diagnosis     [email or web address]       History reviewed. No pertinent past medical history.    History reviewed. No pertinent surgical history.    History reviewed. No pertinent family history.    No current outpatient medications on file.       No Known Allergies    Pt reports that he has been smoking cigarettes.  He has been smoking about 1.00 pack per day. He has quit using smokeless tobacco. He reports that he drinks alcohol. He reports that he does not use drugs.    Exam:    Awake, Alert OX3  Lungs - CTA bilaterally  CV - RRR, no murmurs, distal pulses intact  Abd - soft, non-distended, non-tender, +BS  Extr - No cyanosis or edema    A/P 29 year old year old male in need of upper and lower endoscopy for weight loss.. Risks, benefits, alternatives, and complications were discussed including the possibility of perforation and the patient agreed to proceed.    Luigi Diana MD

## 2019-06-29 LAB — COPATH REPORT: NORMAL

## 2019-09-13 ENCOUNTER — HOSPITAL ENCOUNTER (EMERGENCY)
Facility: CLINIC | Age: 29
Discharge: HOME | End: 2019-09-13
Attending: STUDENT IN AN ORGANIZED HEALTH CARE EDUCATION/TRAINING PROGRAM | Admitting: STUDENT IN AN ORGANIZED HEALTH CARE EDUCATION/TRAINING PROGRAM
Payer: COMMERCIAL

## 2019-09-13 VITALS
OXYGEN SATURATION: 98 % | TEMPERATURE: 97.7 F | SYSTOLIC BLOOD PRESSURE: 131 MMHG | DIASTOLIC BLOOD PRESSURE: 85 MMHG | HEART RATE: 68 BPM | RESPIRATION RATE: 18 BRPM

## 2019-09-13 DIAGNOSIS — H10.31 ACUTE CONJUNCTIVITIS OF RIGHT EYE, UNSPECIFIED ACUTE CONJUNCTIVITIS TYPE: ICD-10-CM

## 2019-09-13 PROCEDURE — 99283 EMERGENCY DEPT VISIT LOW MDM: CPT | Performed by: STUDENT IN AN ORGANIZED HEALTH CARE EDUCATION/TRAINING PROGRAM

## 2019-09-13 PROCEDURE — 25000132 ZZH RX MED GY IP 250 OP 250 PS 637: Performed by: STUDENT IN AN ORGANIZED HEALTH CARE EDUCATION/TRAINING PROGRAM

## 2019-09-13 PROCEDURE — 99284 EMERGENCY DEPT VISIT MOD MDM: CPT | Mod: Z6 | Performed by: STUDENT IN AN ORGANIZED HEALTH CARE EDUCATION/TRAINING PROGRAM

## 2019-09-13 PROCEDURE — 25000125 ZZHC RX 250: Performed by: STUDENT IN AN ORGANIZED HEALTH CARE EDUCATION/TRAINING PROGRAM

## 2019-09-13 RX ORDER — ONDANSETRON 4 MG/1
4 TABLET, FILM COATED ORAL EVERY 6 HOURS PRN
COMMUNITY

## 2019-09-13 RX ORDER — POLYMYXIN B SULFATE AND TRIMETHOPRIM 1; 10000 MG/ML; [USP'U]/ML
2 SOLUTION OPHTHALMIC EVERY 4 HOURS PRN
Status: DISCONTINUED | OUTPATIENT
Start: 2019-09-13 | End: 2019-09-13 | Stop reason: HOSPADM

## 2019-09-13 RX ORDER — TETRACAINE HYDROCHLORIDE 5 MG/ML
2 SOLUTION OPHTHALMIC ONCE
Status: COMPLETED | OUTPATIENT
Start: 2019-09-13 | End: 2019-09-13

## 2019-09-13 RX ADMIN — POLYMYXIN B SULFATE AND TRIMETHOPRIM 2 DROP: 10000; 1 SOLUTION OPHTHALMIC at 03:09

## 2019-09-13 RX ADMIN — TETRACAINE HYDROCHLORIDE 2 DROP: 5 SOLUTION OPHTHALMIC at 02:43

## 2019-09-13 NOTE — ED PROVIDER NOTES
History     Chief Complaint   Patient presents with     Eye Pain     HPI  Jesse Cisneros is a 29 year old male who presented to the department for evaluation of right eye irritation and foreign body sensation.  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  Shortly after returning home from work around 4 PM he developed a foreign body sensation along the medial aspect of his right eye.  Symptoms are exacerbated with blinking and closing his eyes, but is not able to identify foreign body when looking in the mirror.  He does not wear contact lenses or corrective eyewear.  He has been without headache, diminished visual acuity, deep boring ocular pain, periorbital swelling, ocular discharge, or other concerns.    Allergies:  No Known Allergies    Problem List:    Patient Active Problem List    Diagnosis Date Noted     [facility] 06/28/2006     Priority: Medium        Past Medical History:    No past medical history on file.    Past Surgical History:    Past Surgical History:   Procedure Laterality Date     COLONOSCOPY N/A 6/27/2019    Procedure: COLONOSCOPY, WITH  BIOPSIES, ESOPHAGOGASTRODUODENOSCOPY WITH BIOPSIES;  Surgeon: Luigi Diana MD;  Location: [facility]     ESOPHAGOSCOPY, GASTROSCOPY, DUODENOSCOPY (EGD), COMBINED N/A 6/27/2019    Procedure: ESOPHAGOGASTRODUODENOSCOPY, WITH BIOPSY;  Surgeon: Luigi Diana MD;  Location: [facility]       Family History:    No family history on file.    Social History:  Marital Status:  Single [1]  Social History     Tobacco Use     Smoking status: Current Every Day Smoker     Packs/day: 1.00     Types: Cigarettes     Smokeless tobacco: Former User   Substance Use Topics     Alcohol use: Yes     Comment: 1 beer daily.     Drug use: No        Medications:      ondansetron (ZOFRAN) 4 MG tablet         Review of Systems  Constitutional:  Negative for fever or illness.  Eye: Positive for foreign body sensation of right  eye.  Respiratory:  Negative for cough or shortness of breath.  Neurological:  Negative for headache.    All others reviewed and are negative.      Physical Exam   BP: 131/85  Pulse: 68  Temp: 97.7  F (36.5  C)  Resp: 18  SpO2: 98 %      Physical Exam  Constitutional:  Well developed, well nourished.  Appears nontoxic and in no acute distress.   HENT:  Normocephalic and atraumatic.  Eye:  Visual acuity is 20/20 of each individual eye.  Eyelids appear symmetrical and without ptosis.  No proptosis or subconjunctival hemorrhage.  EOMI and denies diplopia or pain with movement.  PERRLA without photophobia.  Mild right-sided conjunctivitis without ciliary flushing or discharge.  Cornea clear and without hyphema or hypopyon.  No FB with eyelid eversion.  Tetracaine resolved patient's discomfort.  Fluorescein without corneal defect.  Slit Lamp was used for evaluation.    Neck:  Neck supple.  Cardiovascular:  No cyanosis.   Respiratory:  Effort normal, no respiratory distress.   Musculoskeletal:  Moves extremities spontaneously.  Neurological:  Patient is alert.  Skin:  Skin is warm and dry.  Psychiatric:  Normal mood and affect.      ED Course        Procedures               Critical Care time:  none               No results found for this or any previous visit (from the past 24 hour(s)).    Medications   trimethoprim-polymyxin b (POLYTRIM) ophthalmic solution 2 drop (2 drops Right Eye Given 9/13/19 0302)   tetracaine (PONTOCAINE) 0.5 % ophthalmic solution 2 drop (2 drops Right Eye Given 9/13/19 0243)       Assessments & Plan (with Medical Decision Making)   Jesse Cisneros is a 29 year old male who presents to the department for complaint of foreign body sensation of right eye beginning 10 hours prior to arrival.  He does not have typical signs or symptoms of optic neuritis, acute closed angle glaucoma, iritis/uveitis, scleritis, keratitis, or endophthalmitis.  No sign of foreign body with eyelid eversion.  On slit-lamp  examination he appears to have some subtle abrasions over the entire of the medial right eye, no hordeolum or discharge.  Patient symptoms completely resolved with tetracaine.  Suspect symptoms are due to conjunctival abrasion/irritation or early conjunctivitis.  He will be prescribed ophthalmologic antibiotic drops, instructed to monitor closely for expected improvement over the next 2-3 days.  He agrees with discharge plan including return instructions for change in symptoms or other concerns.        Disclaimer:  This note consists of symbols derived from keyboarding, dictation, and/or voice recognition software.  As a result, there may be errors in the script that have gone undetected.  Please consider this when interpreting information found in the chart.        I have reviewed the nursing notes.    I have reviewed the findings, diagnosis, plan and need for follow up with the patient.       Discharge Medication List as of 9/13/2019  3:06 AM          Final diagnoses:   Acute conjunctivitis of right eye, unspecified acute conjunctivitis type       9/13/2019   [facility] EMERGENCY DEPARTMENT     Nixon Balbuena DO  09/13/19 0345

## 2019-09-13 NOTE — ED AVS SNAPSHOT
[facility] Emergency Department  [address] [address]  Phone:  [phone]  Fax:  [phone]                                    Jesse Cisneros   MRN: [id number]    Department:  [facility] Emergency Department   Date of Visit:  9/13/2019           After Visit Summary Signature Page    I have received my discharge instructions, and my questions have been answered. I have discussed any challenges I see with this plan with the nurse or doctor.    ..........................................................................................................................................  Patient/Patient Representative Signature      ..........................................................................................................................................  Patient Representative Print Name and Relationship to Patient    ..................................................               ................................................  Date                                   Time    ..........................................................................................................................................  Reviewed by Signature/Title    ...................................................              ..............................................  Date                                               Time          22EPIC Rev 08/18

## 2019-09-13 NOTE — LETTER
September 13, 2019      To Whom It May Concern:      Jesse Cisneros was seen in our Emergency Department today, 09/13/19.  I expect his condition to improve over the next 1-2 days.  He may return to work/school when improved.    Sincerely,        Nixon Balbuena, DO

## 2020-07-13 ENCOUNTER — HOSPITAL ENCOUNTER (EMERGENCY)
Facility: CLINIC | Age: 30
Discharge: HOME | End: 2020-07-13
Attending: EMERGENCY MEDICINE | Admitting: EMERGENCY MEDICINE
Payer: COMMERCIAL

## 2020-07-13 ENCOUNTER — APPOINTMENT (OUTPATIENT)
Dept: GENERAL RADIOLOGY | Facility: CLINIC | Age: 30
End: 2020-07-13
Attending: EMERGENCY MEDICINE
Payer: COMMERCIAL

## 2020-07-13 VITALS
SYSTOLIC BLOOD PRESSURE: 117 MMHG | TEMPERATURE: 98 F | OXYGEN SATURATION: 99 % | DIASTOLIC BLOOD PRESSURE: 75 MMHG | HEART RATE: 68 BPM | RESPIRATION RATE: 16 BRPM

## 2020-07-13 DIAGNOSIS — S96.911A STRAIN OF RIGHT FOOT, INITIAL ENCOUNTER: ICD-10-CM

## 2020-07-13 DIAGNOSIS — M79.671 RIGHT FOOT PAIN: ICD-10-CM

## 2020-07-13 PROCEDURE — 99284 EMERGENCY DEPT VISIT MOD MDM: CPT | Mod: Z6 | Performed by: EMERGENCY MEDICINE

## 2020-07-13 PROCEDURE — 99283 EMERGENCY DEPT VISIT LOW MDM: CPT | Performed by: EMERGENCY MEDICINE

## 2020-07-13 PROCEDURE — 73630 X-RAY EXAM OF FOOT: CPT | Mod: RT

## 2020-07-13 RX ORDER — HYDROCODONE BITARTRATE AND ACETAMINOPHEN 5; 325 MG/1; MG/1
1 TABLET ORAL EVERY 6 HOURS PRN
Qty: 8 TABLET | Refills: 0 | Status: SHIPPED | OUTPATIENT
Start: 2020-07-13 | End: 2020-07-16

## 2020-07-13 ASSESSMENT — ENCOUNTER SYMPTOMS
FEVER: 0
ABDOMINAL PAIN: 0
SHORTNESS OF BREATH: 0

## 2020-07-13 NOTE — ED NOTES
Patient wakeboarding yesterday when he fell with right foot being ripped out of wakeboard shoe.  Since than c/o pain to lateral right foot radiating to top of foot.  CMS good.  Swelling and bruising noted.  Last ibuprofen at midnight.  No deformity noted

## 2020-07-13 NOTE — ED AVS SNAPSHOT
[facility] Emergency Department  [address] [address]  Phone:  [phone]  Fax:  [phone]                                    Jesse Cisneros   MRN: [id number]    Department:  [facility] Emergency Department   Date of Visit:  7/13/2020           After Visit Summary Signature Page    I have received my discharge instructions, and my questions have been answered. I have discussed any challenges I see with this plan with the nurse or doctor.    ..........................................................................................................................................  Patient/Patient Representative Signature      ..........................................................................................................................................  Patient Representative Print Name and Relationship to Patient    ..................................................               ................................................  Date                                   Time    ..........................................................................................................................................  Reviewed by Signature/Title    ...................................................              ..............................................  Date                                               Time          22EPIC Rev 08/18

## 2020-07-13 NOTE — LETTER
July 13, 2020      To Whom It May Concern:      Jesse Cisneros was seen in our Emergency Department today, 07/13/20.  I expect his condition to improve over the next 2-3 days.  He may return to work/school when improved.    Sincerely,        Fawad Hayes MD

## 2020-07-13 NOTE — ED PROVIDER NOTES
History     Chief Complaint   Patient presents with     Foot Pain     HPI  Jesse Cisneros is a 30 year old male who has past medical history significant for tobacco abuse, presenting to the emergency department with concerns regarding right foot pain.  Patient states he was wakeboarding yesterday afternoon, when at approximately 3 PM patient had fall, with his right foot being ripped out of the wakeboard boot.  Patient had immediate pain to the dorsum, and right aspect of the right foot.  He has had ability to walk, however has moderate severity of pain with ambulation.  Denies any numbness, or tingling.  No injury elsewhere.  No prior foot or ankle surgeries.  Denies any ankle injury, or ankle pain.  Patient did take ibuprofen at midnight without significant alleviation of the pain.    Allergies:  No Known Allergies    Problem List:    Patient Active Problem List    Diagnosis Date Noted     [facility] 06/28/2006     Priority: Medium        Past Medical History:    No past medical history on file.    Past Surgical History:    Past Surgical History:   Procedure Laterality Date     COLONOSCOPY N/A 6/27/2019    Procedure: COLONOSCOPY, WITH  BIOPSIES, ESOPHAGOGASTRODUODENOSCOPY WITH BIOPSIES;  Surgeon: Luigi Diana MD;  Location: [facility]     ESOPHAGOSCOPY, GASTROSCOPY, DUODENOSCOPY (EGD), COMBINED N/A 6/27/2019    Procedure: ESOPHAGOGASTRODUODENOSCOPY, WITH BIOPSY;  Surgeon: Luigi Diana MD;  Location: [facility]       Family History:    No family history on file.    Social History:  Marital Status:  Single [1]  Social History     Tobacco Use     Smoking status: Current Every Day Smoker     Packs/day: 1.00     Types: Cigarettes     Smokeless tobacco: Former User   Substance Use Topics     Alcohol use: Yes     Comment: 1 beer daily.     Drug use: No        Medications:    HYDROcodone-acetaminophen (NORCO) 5-325 MG tablet  ondansetron (ZOFRAN) 4 MG tablet          Review of Systems   Constitutional: Negative for fever.    Respiratory: Negative for shortness of breath.    Cardiovascular: Negative for chest pain.   Gastrointestinal: Negative for abdominal pain.   Musculoskeletal:        Right foot pain   All other systems reviewed and are negative.      Physical Exam   BP: 117/75  Pulse: 68  Temp: 98  F (36.7  C)  Resp: 16  SpO2: 99 %      Physical Exam  /75   Pulse 68   Temp 98  F (36.7  C) (Oral)   Resp 16   SpO2 99%   General: alert and in no acute distress  Head: atraumatic, normocephalic  Abd: nondistended  Musculoskel/Extremities:  Dorsum of right foot with tenderness to palpation, and some mild swelling over the dorsum of the right foot.  Normal distal perfusion, capillary refill, and sensation.  Skin: no rashes, no diaphoresis and skin color normal  Neuro: Patient awake, alert, oriented, speech is fluent, gait is normal  Psychiatric: affect/mood normal, cooperative, normal judgement/insight and memory intact      ED Course        Procedures               Critical Care time:  none               Results for orders placed or performed during the hospital encounter of 07/13/20 (from the past 24 hour(s))   Foot  XR, G/E 3 views, right    Narrative    EXAM: XR FOOT RT G/E 3 VW  LOCATION: [facility]  DATE/TIME: 7/13/2020 5:55 AM    INDICATION: Pain after wake-boarding injury.  COMPARISON: None.      Impression    IMPRESSION: Normal joint spaces and alignment. No fracture.                    Medications - No data to display    Assessments & Plan (with Medical Decision Making)  30 year old male, presenting to the emergency department with concerns regarding right foot pain after patient had wakeboarding injury/accident, with foot being ripped out of the boot.  No injury elsewhere.  Pain moderate in severity, unrelieved with ibuprofen.  X-ray images performed, reviewed by myself in addition to radiology interpretation showing no evidence of fracture.  Patient with likely ligamentous strain.  Will be discharged  home and follow-up in clinic as needed.  8 tablets Norco prescribed.  Recommended ice, NSAIDs, elevation, rest.     I have reviewed the nursing notes.    I have reviewed the findings, diagnosis, plan and need for follow up with the patient.       New Prescriptions    HYDROCODONE-ACETAMINOPHEN (NORCO) 5-325 MG TABLET    Take 1 tablet by mouth every 6 hours as needed for pain       Final diagnoses:   Right foot pain   Strain of right foot, initial encounter       7/13/2020   [facility] EMERGENCY DEPARTMENT     Fawad Hayes MD  07/13/20 0610

## 2022-05-18 ENCOUNTER — OFFICE VISIT (OUTPATIENT)
Dept: URGENT CARE | Facility: URGENT CARE | Age: 32
End: 2022-05-18
Payer: COMMERCIAL

## 2022-05-18 VITALS
BODY MASS INDEX: 27.16 KG/M2 | TEMPERATURE: 98.5 F | DIASTOLIC BLOOD PRESSURE: 82 MMHG | OXYGEN SATURATION: 97 % | SYSTOLIC BLOOD PRESSURE: 132 MMHG | WEIGHT: 192 LBS | HEART RATE: 75 BPM

## 2022-05-18 DIAGNOSIS — J01.10 ACUTE NON-RECURRENT FRONTAL SINUSITIS: ICD-10-CM

## 2022-05-18 DIAGNOSIS — H92.02 LEFT EAR PAIN: Primary | ICD-10-CM

## 2022-05-18 PROCEDURE — 99213 OFFICE O/P EST LOW 20 MIN: CPT | Performed by: NURSE PRACTITIONER

## 2022-05-18 NOTE — PROGRESS NOTES
Assessment & Plan      Diagnosis Comments   1. Left ear pain     2. Acute non-recurrent frontal sinusitis       Mild erythema bilateral sinuses no other abnormality seen with evaluation today patient will continue to monitor symptoms closely may try Zyrtec OTC and or Flonase for symptoms of sinusitis will continue Tylenol ibuprofen as needed for discomfort will return to clinic if symptoms do not improve.    aRdha Marino, MILDRED CNP  [facility]    Óscar Molina is a 32 year old male who presents to clinic today for the following health issues:  Chief Complaint   Patient presents with     Ear Problem     Left ear pain, been going for couple of days, left side of head hurt this morning.      HPI      URI Adult    Onset of symptoms was 2 day(s) ago.  Course of illness is same.    Severity mild  Current and Associated symptoms: ear pain left  Treatment measures tried include Tylenol/Ibuprofen.  Predisposing factors include None.        Review of Systems  Constitutional, HEENT, cardiovascular, pulmonary, gi and gu systems are negative, except as otherwise noted.      Objective    /82   Pulse 75   Temp 98.5  F (36.9  C) (Tympanic)   Wt 87.1 kg (192 lb)   SpO2 97%   BMI 27.16 kg/m    Physical Exam   GENERAL: healthy, alert and no distress  EYES: Eyes grossly normal to inspection, PERRL and conjunctivae and sclerae normal  HENT: normal cephalic/atraumatic, ear canals and TM's normal, nose and mouth without ulcers or lesions, nasal mucosa edematous , rhinorrhea clear, oropharynx clear and oral mucous membranes moist  NECK: no adenopathy, no asymmetry, masses, or scars and thyroid normal to palpation  RESP: lungs clear to auscultation - no rales, rhonchi or wheezes  CV: regular rate and rhythm, normal S1 S2, no S3 or S4, no murmur, click or rub, no peripheral edema and peripheral pulses strong  ABDOMEN: soft, nontender, no hepatosplenomegaly, no masses and bowel sounds  normal  MS: no gross musculoskeletal defects noted, no edema

## 2022-05-18 NOTE — PATIENT INSTRUCTIONS
Recommend zyrtec 10 mg daily for 2-4 weeks for symptoms of allergies. May try flonase as well this is sinus spray once daily.     Both of these are OTC.     Return to clinic if symptoms worsen

## 2024-03-22 ENCOUNTER — HOSPITAL ENCOUNTER (EMERGENCY)
Facility: CLINIC | Age: 34
Discharge: HOME | End: 2024-03-22
Admitting: EMERGENCY MEDICINE
Payer: OTHER MISCELLANEOUS

## 2024-03-22 VITALS
TEMPERATURE: 98.4 F | OXYGEN SATURATION: 99 % | RESPIRATION RATE: 18 BRPM | SYSTOLIC BLOOD PRESSURE: 150 MMHG | HEART RATE: 84 BPM | DIASTOLIC BLOOD PRESSURE: 89 MMHG

## 2024-03-22 DIAGNOSIS — R10.30 LOWER ABDOMINAL PAIN: ICD-10-CM

## 2024-03-22 PROCEDURE — 99283 EMERGENCY DEPT VISIT LOW MDM: CPT | Performed by: EMERGENCY MEDICINE

## 2024-03-22 PROCEDURE — G0463 HOSPITAL OUTPT CLINIC VISIT: HCPCS | Performed by: EMERGENCY MEDICINE

## 2024-03-22 PROCEDURE — 99282 EMERGENCY DEPT VISIT SF MDM: CPT | Performed by: EMERGENCY MEDICINE

## 2024-03-22 ASSESSMENT — COLUMBIA-SUICIDE SEVERITY RATING SCALE - C-SSRS
1. IN THE PAST MONTH, HAVE YOU WISHED YOU WERE DEAD OR WISHED YOU COULD GO TO SLEEP AND NOT WAKE UP?: NO
2. HAVE YOU ACTUALLY HAD ANY THOUGHTS OF KILLING YOURSELF IN THE PAST MONTH?: NO
6. HAVE YOU EVER DONE ANYTHING, STARTED TO DO ANYTHING, OR PREPARED TO DO ANYTHING TO END YOUR LIFE?: NO

## 2024-03-22 ASSESSMENT — ACTIVITIES OF DAILY LIVING (ADL): ADLS_ACUITY_SCORE: 35

## 2024-03-22 NOTE — ED NOTES
Patient reports that he was straining and lifting on Wednesday at work and developed pain in his inguinal area on both sides and now pain has moved to his testicles.  Advised the patient be evaluated in the emergency department as he may need imaging that is not available in urgent care.  Patient verbalizes understanding of these recommendations and is agreeable to being seen in the emergency department versus urgent care.      Tracy Meyers, APRN CNP  03/22/24 9286

## 2024-03-22 NOTE — Clinical Note
Jesse Cisneros was seen and treated in our emergency department on 3/22/2024.  He may return to work on 03/23/2024.  Avoid heavy lifting >15 pounds if able over next week     If you have any questions or concerns, please don't hesitate to call.      Fawad Hayes MD

## 2024-03-22 NOTE — ED PROVIDER NOTES
ED Provider Note  [facility]      History     Chief Complaint   Patient presents with    Abdominal Pain     HPI  Jesse Cisneros is a 34 year old male who presents to the emergency department after patient had been evaluated in urgent care, and concerned about lower abdominal pain.  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  Has had now occasional pain in the lower abdomen, left greater than right.  No bulge or masslike sensation.  No urinary changes.  No bowel movement changes.  No prior lower abdominal or other abdominal surgeries.        Independent Historian:        Review of External Notes:          Allergies:  No Known Allergies    Problem List:    Patient Active Problem List    Diagnosis Date Noted    iamLUMBAGO 06/28/2006     Priority: Medium        Past Medical History:    No past medical history on file.    Past Surgical History:    Past Surgical History:   Procedure Laterality Date    COLONOSCOPY N/A 6/27/2019    Procedure: COLONOSCOPY, WITH  BIOPSIES, ESOPHAGOGASTRODUODENOSCOPY WITH BIOPSIES;  Surgeon: Luigi Diana MD;  Location: [facility]    ESOPHAGOSCOPY, GASTROSCOPY, DUODENOSCOPY (EGD), COMBINED N/A 6/27/2019    Procedure: ESOPHAGOGASTRODUODENOSCOPY, WITH BIOPSY;  Surgeon: Luigi Diana MD;  Location: [facility]       Family History:    No family history on file.    Social History:  Marital Status:   [2]  Social History     Tobacco Use    Smoking status: Every Day     Packs/day: 1     Types: Cigarettes    Smokeless tobacco: Former   Substance Use Topics    Alcohol use: Yes     Comment: 1 beer daily.    Drug use: No        Medications:    ondansetron (ZOFRAN) 4 MG tablet          Review of Systems  A medically appropriate review of systems was performed with pertinent positives and negatives noted in the HPI, and all other  systems negative.    Physical Exam   Patient Vitals for the past 24 hrs:   BP Temp Temp src Pulse Resp SpO2   03/22/24 1404 (!) 150/89 98.4  F (36.9  C) Tympanic 84 18 99 %          Physical Exam  General: alert and in no acute distress on arrival  Head: atraumatic, normocephalic  Lungs:  nonlabored  CV:  extremities warm and perfused  Abd: nondistended.  Lower abdominal exam with no tenderness to palpation.  No palpable mass.  No inguinal hernia palpated.  Skin: no rashes, no diaphoresis and skin color normal  Neuro: Patient awake, alert, speech is fluent,   Psychiatric: affect/mood normal,        ED Course                 Procedures                           No results found for this or any previous visit (from the past 24 hour(s)).    MEDICATIONS GIVEN IN THE EMERGENCY DEPARTMENT:  Medications - No data to display        Independent Interpretation (X-rays, CTs, rhythm strip):  None    Consultations/Discussion of Management or Tests:  None       Social Determinants of Health affecting care:         Assessments & Plan (with Medical Decision Making)  34 year old male who presents to the Emergency Department for evaluation of lower abdominal pain.  Patient with symptoms after heavy lifting 2 days ago.  Patient with no palpable mass, or bulge, or other concerns at this point for obvious hernia.  Discussed with patient this may represent inguinal hernia, however no signs of incarceration, or other presence of palpable mass currently.  Potential also for muscular strain.  Recommended follow-up in clinic as needed, and return instructions discussed.       I have reviewed the nursing notes.    I have reviewed the findings, diagnosis, plan and need for follow up with the patient.       Critical Care time:  none      NEW PRESCRIPTIONS STARTED AT TODAY'S ER VISIT  New Prescriptions    No medications on file       Final diagnoses:   Lower abdominal pain       3/22/2024   [facility] EMERGENCY DEPT       Freddy  Fawad Meyers MD  03/22/24 [id number]

## 2024-03-22 NOTE — DISCHARGE INSTRUCTIONS
Follow-up in clinic as needed.    Avoid heavy lifting if at all possible.    If ongoing symptoms, or feeling mass/bulge, would follow-up in surgery clinic.  Return if persistent vomiting with severe worsening of pain.

## (undated) RX ORDER — GLYCOPYRROLATE 0.2 MG/ML
INJECTION, SOLUTION INTRAMUSCULAR; INTRAVENOUS
Status: DISPENSED
Start: 2019-06-27